# Patient Record
Sex: FEMALE | Race: WHITE | URBAN - METROPOLITAN AREA
[De-identification: names, ages, dates, MRNs, and addresses within clinical notes are randomized per-mention and may not be internally consistent; named-entity substitution may affect disease eponyms.]

---

## 2022-10-23 PROBLEM — Z78.9 ADMITTED TO SUBSTANCE MISUSE DETOXIFICATION CENTER: Status: ACTIVE | Noted: 2022-10-23

## 2023-01-25 PROBLEM — F17.200 MILD TOBACCO USE DISORDER: Status: ACTIVE | Noted: 2019-04-11

## 2023-01-25 PROBLEM — B00.9 PCR DNA POSITIVE FOR HSV2: Status: ACTIVE | Noted: 2023-01-25

## 2023-01-25 PROBLEM — Z91.49 HISTORY OF PSYCHOLOGICAL TRAUMA: Status: ACTIVE | Noted: 2023-01-25

## 2023-01-25 PROBLEM — F32.A DEPRESSIVE DISORDER: Status: ACTIVE | Noted: 2023-01-25

## 2023-01-25 PROBLEM — L50.9 URTICARIA: Status: ACTIVE | Noted: 2023-01-25

## 2023-01-25 PROBLEM — F31.60 BIPOLAR AFFECTIVE DISORDER, CURRENT EPISODE MIXED (HCC): Status: ACTIVE | Noted: 2019-05-30

## 2023-01-25 PROBLEM — F43.10 POSTTRAUMATIC STRESS DISORDER: Status: ACTIVE | Noted: 2023-01-25

## 2023-01-25 PROBLEM — O09.292 HIGH RISK PREGNANCY DUE TO HISTORY OF PREVIOUS OBSTETRICAL PROBLEM IN SECOND TRIMESTER: Status: ACTIVE | Noted: 2021-12-07

## 2023-01-25 PROBLEM — N92.6 IRREGULAR MENSTRUATION, UNSPECIFIED: Status: ACTIVE | Noted: 2023-01-25

## 2023-01-25 PROBLEM — F19.90 SUBSTANCE USE DISORDER: Status: ACTIVE | Noted: 2021-12-07

## 2023-01-25 PROBLEM — F15.20 METHAMPHETAMINE DEPENDENCE (HCC): Status: ACTIVE | Noted: 2017-08-05

## 2023-01-25 RX ORDER — ASPIRIN 81 MG/1
81 TABLET ORAL DAILY
COMMUNITY
Start: 2022-01-20

## 2023-01-25 RX ORDER — PSEUDOEPHEDRINE HCL 30 MG
100 TABLET ORAL 2 TIMES DAILY
COMMUNITY
Start: 2022-02-10

## 2023-01-25 RX ORDER — ENOXAPARIN SODIUM 100 MG/ML
60 INJECTION SUBCUTANEOUS DAILY
COMMUNITY
Start: 2022-01-20

## 2023-01-25 RX ORDER — FERROUS SULFATE 325(65) MG
325 TABLET ORAL 2 TIMES DAILY
COMMUNITY
Start: 2022-02-10

## 2023-01-26 ENCOUNTER — ROUTINE PRENATAL (OUTPATIENT)
Dept: OBGYN CLINIC | Age: 33
End: 2023-01-26

## 2023-01-26 VITALS — WEIGHT: 265 LBS | BODY MASS INDEX: 36.96 KG/M2

## 2023-01-26 DIAGNOSIS — F19.90 SUBSTANCE USE DISORDER: ICD-10-CM

## 2023-01-26 DIAGNOSIS — O09.33 SUPERVISION OF HIGH-RISK PREGNANCY WITH INSUFFICIENT PRENATAL CARE, THIRD TRIMESTER: ICD-10-CM

## 2023-01-26 DIAGNOSIS — O09.33 SUPERVISION OF HIGH-RISK PREGNANCY WITH INSUFFICIENT PRENATAL CARE, THIRD TRIMESTER: Primary | ICD-10-CM

## 2023-01-26 DIAGNOSIS — I63.9 CEREBROVASCULAR ACCIDENT (CVA), UNSPECIFIED MECHANISM (HCC): ICD-10-CM

## 2023-01-26 RX ORDER — ENOXAPARIN SODIUM 100 MG/ML
40 INJECTION SUBCUTANEOUS 2 TIMES DAILY
Qty: 60 EACH | Refills: 3 | Status: SHIPPED | OUTPATIENT
Start: 2023-01-26

## 2023-01-26 RX ORDER — PRENATAL VIT/IRON FUM/FOLIC AC 27MG-0.8MG
1 TABLET ORAL DAILY
Qty: 30 TABLET | Refills: 0 | Status: SHIPPED | OUTPATIENT
Start: 2023-01-26

## 2023-01-26 RX ORDER — LANOLIN ALCOHOL/MO/W.PET/CERES
325 CREAM (GRAM) TOPICAL 2 TIMES DAILY
Qty: 90 TABLET | Refills: 3 | Status: SHIPPED | OUTPATIENT
Start: 2023-01-26

## 2023-01-26 NOTE — PROGRESS NOTES
No LMP recorded. Patient is pregnant. Please reference prenatal and OB flow chart for further information  PT here today for routine prenatal care  Pt endorses fetal movement and denies loss of fluid, contractions or vaginal bleeding  Patient has had limited prenatal care. She is 32 weeks and 3 days. She had an ultrasound at 19 weeks. She has a history of marijuana and methamphetamine abuse. She is presently living in a sober living place. She was recently incarcerated. 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 She had been on Lovenox and this will be restarted. 40 mg daily. She is feeling good movement. ROS:  Pt denies headache, vision changes, right upper quadrant pain, dysuria, or nausea/vomiting,     PE:  Wt 265 lb (120.2 kg)   BMI 36.96 kg/m²   Gen - Alert and oriented x 3  HEENT- NC/AT, CVS - RRR, Lungs - CTAB  Abd - FH 32  FHR 61      ASSESSMENT AND PLAN:   IUP at 32 weeks and 3 days  History of illicit drug abuse marijuana and methamphetamine patient now clean. History of a CVA  Will start Lovenox 40 mg subcu  Patient needs to do her GTT.   Iron twice daily  Ultrasound for serial growth  Patient to follow-up in 2 weeks

## 2023-01-26 NOTE — PROGRESS NOTES
Transfer from [address] [place] [address] . Currently in treatment, thc (and meth per chart history). patient is concerned because she had a CVA at age 7 and should be on lovenox

## 2023-01-30 LAB
SPECIMEN SOURCE: NORMAL
T. VAGINALIS AMPLIFIED: NEGATIVE

## 2023-01-31 LAB
C. TRACHOMATIS DNA ,URINE: NEGATIVE
N. GONORRHOEAE DNA, URINE: NEGATIVE

## 2025-02-13 ENCOUNTER — APPOINTMENT (OUTPATIENT)
Dept: CT IMAGING | Age: 35
End: 2025-02-13
Payer: MEDICAID

## 2025-02-13 ENCOUNTER — APPOINTMENT (OUTPATIENT)
Dept: GENERAL RADIOLOGY | Age: 35
End: 2025-02-13
Payer: MEDICAID

## 2025-02-13 ENCOUNTER — HOSPITAL ENCOUNTER (EMERGENCY)
Age: 35
Discharge: HOME | End: 2025-02-13
Payer: MEDICAID

## 2025-02-13 VITALS
WEIGHT: 200 LBS | RESPIRATION RATE: 16 BRPM | DIASTOLIC BLOOD PRESSURE: 89 MMHG | BODY MASS INDEX: 28 KG/M2 | HEIGHT: 71 IN | SYSTOLIC BLOOD PRESSURE: 121 MMHG | TEMPERATURE: 97.7 F | HEART RATE: 89 BPM | OXYGEN SATURATION: 98 %

## 2025-02-13 DIAGNOSIS — M54.9 BACK PAIN DUE TO INJURY: Primary | ICD-10-CM

## 2025-02-13 DIAGNOSIS — M25.551 RIGHT HIP PAIN: ICD-10-CM

## 2025-02-13 PROCEDURE — 72100 X-RAY EXAM L-S SPINE 2/3 VWS: CPT

## 2025-02-13 PROCEDURE — 99284 EMERGENCY DEPT VISIT MOD MDM: CPT

## 2025-02-13 PROCEDURE — 70450 CT HEAD/BRAIN W/O DYE: CPT

## 2025-02-13 PROCEDURE — 72072 X-RAY EXAM THORAC SPINE 3VWS: CPT

## 2025-02-13 PROCEDURE — 73502 X-RAY EXAM HIP UNI 2-3 VIEWS: CPT

## 2025-02-13 RX ORDER — ACETAMINOPHEN 325 MG/1
650 TABLET ORAL EVERY 6 HOURS PRN
Qty: 30 TABLET | Refills: 0 | Status: SHIPPED | OUTPATIENT
Start: 2025-02-13

## 2025-02-13 ASSESSMENT — LIFESTYLE VARIABLES
HOW MANY STANDARD DRINKS CONTAINING ALCOHOL DO YOU HAVE ON A TYPICAL DAY: PATIENT DOES NOT DRINK
HOW OFTEN DO YOU HAVE A DRINK CONTAINING ALCOHOL: NEVER

## 2025-02-13 ASSESSMENT — PAIN DESCRIPTION - ORIENTATION: ORIENTATION: RIGHT

## 2025-02-13 ASSESSMENT — ENCOUNTER SYMPTOMS
VOMITING: 0
ABDOMINAL PAIN: 0
GASTROINTESTINAL NEGATIVE: 1
RESPIRATORY NEGATIVE: 1
BACK PAIN: 1
ALLERGIC/IMMUNOLOGIC NEGATIVE: 1
EYES NEGATIVE: 1
NAUSEA: 0
SHORTNESS OF BREATH: 0

## 2025-02-13 ASSESSMENT — PAIN DESCRIPTION - PAIN TYPE: TYPE: ACUTE PAIN

## 2025-02-13 ASSESSMENT — PAIN DESCRIPTION - FREQUENCY: FREQUENCY: CONTINUOUS

## 2025-02-13 ASSESSMENT — PAIN SCALES - GENERAL: PAINLEVEL_OUTOF10: 6

## 2025-02-13 ASSESSMENT — PAIN - FUNCTIONAL ASSESSMENT: PAIN_FUNCTIONAL_ASSESSMENT: 0-10

## 2025-02-13 ASSESSMENT — PAIN DESCRIPTION - LOCATION: LOCATION: BACK;HIP;COCCYX

## 2025-02-13 NOTE — ED PROVIDER NOTES
[facility] EMERGENCY DEPARTMENT  eMERGENCY dEPARTMENT eNCOUnter      Pt Name: Shelby White  MRN: [id number]  Birthdate 1990  Date of evaluation: 2/13/2025  Provider: RACHEAL Alcantar CNP      HISTORY OF PRESENT ILLNESS    Shelby White is a 34 y.o. female who presents to the Emergency Department with past medical history of anxiety depression PTSD stroke.  Patient presents today with complaints of fall on ice at approximately 945 this morning.  Patient is unsure if she hit her head but denies loss of consciousness.  Patient does report she feels out of it as well as dizziness and headache.  Patient reports pain to right hip and mid and lower back.  Patient denies numbness tingling weakness.  Patient denies saddle anesthesia and loss of bowel or bladder.  No foot drop was noted.  Patient is afebrile denies fevers at home.  Patient reports she is compliant with Eliquis daily due to history of stroke and PE.  Patient denies medication administration prior to arrival.  Patient denies chest pain shortness of breath abdominal pain nausea vomiting chills IV drug use dysuria flank pain.  Patient is hemodynamically stable nontoxic-appearing.        REVIEW OF SYSTEMS       Review of Systems   Constitutional: Negative.  Negative for chills and fever.   HENT: Negative.     Eyes: Negative.    Respiratory: Negative.  Negative for shortness of breath.    Cardiovascular: Negative.  Negative for chest pain.   Gastrointestinal: Negative.  Negative for abdominal pain, nausea and vomiting.   Endocrine: Negative.    Genitourinary: Negative.    Musculoskeletal:  Positive for arthralgias, back pain and myalgias. Negative for gait problem, joint swelling and neck pain.        Patient reports pain to right hip, lumbar and thoracic back.   Skin: Negative.  Negative for wound.   Allergic/Immunologic: Negative.    Neurological:  Positive for dizziness and headaches. Negative for weakness, light-headedness and numbness.

## 2025-02-13 NOTE — ED TRIAGE NOTES
Patient arrived nessa private car due to falling and landing on her lower back and R side of her hip. Patient denies any LOC, she drove here, took no medication for her pain. A/O x 4, cool and dry. Patient ambulated slow but is able to walk with out any assistance